# Patient Record
Sex: FEMALE
[De-identification: names, ages, dates, MRNs, and addresses within clinical notes are randomized per-mention and may not be internally consistent; named-entity substitution may affect disease eponyms.]

---

## 2022-04-06 ENCOUNTER — APPOINTMENT (OUTPATIENT)
Dept: SURGICAL ONCOLOGY | Facility: CLINIC | Age: 57
End: 2022-04-06
Payer: COMMERCIAL

## 2022-04-06 VITALS
SYSTOLIC BLOOD PRESSURE: 147 MMHG | DIASTOLIC BLOOD PRESSURE: 80 MMHG | RESPIRATION RATE: 16 BRPM | HEART RATE: 96 BPM | OXYGEN SATURATION: 73 % | TEMPERATURE: 96.7 F | HEIGHT: 61 IN | BODY MASS INDEX: 45.12 KG/M2 | WEIGHT: 239 LBS

## 2022-04-06 DIAGNOSIS — I10 ESSENTIAL (PRIMARY) HYPERTENSION: ICD-10-CM

## 2022-04-06 DIAGNOSIS — D05.00 LOBULAR CARCINOMA IN SITU OF UNSPECIFIED BREAST: ICD-10-CM

## 2022-04-06 DIAGNOSIS — R92.1 MAMMOGRAPHIC CALCIFICATION FOUND ON DIAGNOSTIC IMAGING OF BREAST: ICD-10-CM

## 2022-04-06 DIAGNOSIS — Z78.9 OTHER SPECIFIED HEALTH STATUS: ICD-10-CM

## 2022-04-06 DIAGNOSIS — Z87.59 PERSONAL HISTORY OF OTHER COMPLICATIONS OF PREGNANCY, CHILDBIRTH AND THE PUERPERIUM: ICD-10-CM

## 2022-04-06 DIAGNOSIS — J45.909 UNSPECIFIED ASTHMA, UNCOMPLICATED: ICD-10-CM

## 2022-04-06 DIAGNOSIS — D05.01 LOBULAR CARCINOMA IN SITU OF RIGHT BREAST: ICD-10-CM

## 2022-04-06 PROCEDURE — 99204 OFFICE O/P NEW MOD 45 MIN: CPT

## 2022-04-06 RX ORDER — AMLODIPINE BESYLATE 5 MG/1
5 TABLET ORAL
Refills: 0 | Status: ACTIVE | COMMUNITY

## 2022-04-06 RX ORDER — ALBUTEROL SULFATE 2.5 MG/.5ML
SOLUTION RESPIRATORY (INHALATION)
Refills: 0 | Status: ACTIVE | COMMUNITY

## 2022-04-06 NOTE — REASON FOR VISIT
[Initial Consultation] : an initial consultation for [Spouse] : spouse [FreeTextEntry2] : Right breast LCIS

## 2022-04-06 NOTE — PHYSICAL EXAM
[Normal] : supple, no neck mass and thyroid not enlarged [Normal Supraclavicular Lymph Nodes] : normal supraclavicular lymph nodes [Normal Axillary Lymph Nodes] : normal axillary lymph nodes [Normal] : oriented to person, place and time, with appropriate affect [FreeTextEntry1] : RADHA present during exam  [de-identified] : Normal S1, S2.  Regular rate and rhythm. [de-identified] : Complete breast exam performed in supine and upright positions.  No palpable masses, tenderness, nipple discharge, inversion, deviation or enlarged axillary or supraclavicular lymph nodes bilaterally. [de-identified] : Clear breath sounds bilaterally, normal respiratory effort.

## 2022-04-06 NOTE — ASSESSMENT
[FreeTextEntry1] : IMP:\par Breast calcifications\par S/p right breast bx 3/2022- PATH: LCIS\par \par \par PLAN:\par Breast MRI (ordered)\par Plan for right magseed localized lumpectomy

## 2022-04-06 NOTE — CONSULT LETTER
[Dear  ___] : Dear  [unfilled], [Consult Letter:] : I had the pleasure of evaluating your patient, [unfilled]. [Consult Closing:] : Thank you very much for allowing me to participate in the care of this patient.  If you have any questions, please do not hesitate to contact me. [Sincerely,] : Sincerely, [FreeTextEntry2] : Gamal Langston [FreeTextEntry3] : Dr. Jason Kim

## 2022-04-06 NOTE — HISTORY OF PRESENT ILLNESS
[de-identified] : Ms. Sosa is a 55 y/o female presenting today for an initial consultation for newly diagnosed right breast LCIS, referred by Dr. Gamal Langston. \par \par Patient underwent a screening mammogram which demonstrated focal nodular densities and calcifications bilaterally in 3/2021 (BIRADS 0).  Pt. underwent a bilateral diagnostic mammogram and bilateral breast ultrasound on 22 which showed right upper inner quadrant indeterminate calcifications for which a stereotactic guided biopsy is recommended. Management of additional calcifications in the right upper outer and right lower outer breast to be determined based on above biopsy results. BIRADS-4. \par \par Patient is s/p right breast 2:00 biopsy on 3/9/2022- PATH: LCIS present within and outside of hyalinized fibroadenoma associated with calcifications.  CONCORDANT. Surgical consultation recommended. A six month f/u diagnostic mammo recommended to ensure stability of numerous scattered additional calcifications.  \par \par Denies palpable breast masses, nipple discharge, skin dimpling, inversion or breast pain. \par \par Past medical history notable for HTN (on amlodipine) and Asthma (albuterol PRN).  Denies surgical history. Denies family history of cancer.  Never a smoker. No alcohol use. .  LMP: @ age 55 y/o.

## 2022-04-14 ENCOUNTER — RESULT REVIEW (OUTPATIENT)
Age: 57
End: 2022-04-14

## 2022-04-14 ENCOUNTER — OUTPATIENT (OUTPATIENT)
Dept: OUTPATIENT SERVICES | Facility: HOSPITAL | Age: 57
LOS: 1 days | End: 2022-04-14
Payer: COMMERCIAL

## 2022-04-14 DIAGNOSIS — C50.919 MALIGNANT NEOPLASM OF UNSPECIFIED SITE OF UNSPECIFIED FEMALE BREAST: ICD-10-CM

## 2022-04-14 PROCEDURE — 88321 CONSLTJ&REPRT SLD PREP ELSWR: CPT

## 2022-04-20 LAB — SURGICAL PATHOLOGY STUDY: SIGNIFICANT CHANGE UP

## 2022-05-19 ENCOUNTER — APPOINTMENT (OUTPATIENT)
Dept: MRI IMAGING | Facility: IMAGING CENTER | Age: 57
End: 2022-05-19

## 2022-06-21 ENCOUNTER — APPOINTMENT (OUTPATIENT)
Dept: MRI IMAGING | Facility: IMAGING CENTER | Age: 57
End: 2022-06-21
Payer: COMMERCIAL

## 2022-06-21 ENCOUNTER — OUTPATIENT (OUTPATIENT)
Dept: OUTPATIENT SERVICES | Facility: HOSPITAL | Age: 57
LOS: 1 days | End: 2022-06-21
Payer: COMMERCIAL

## 2022-06-21 DIAGNOSIS — D05.01 LOBULAR CARCINOMA IN SITU OF RIGHT BREAST: ICD-10-CM

## 2022-06-21 PROCEDURE — 77049 MRI BREAST C-+ W/CAD BI: CPT | Mod: 26

## 2022-06-21 PROCEDURE — C8908: CPT

## 2022-06-21 PROCEDURE — A9585: CPT

## 2022-06-21 PROCEDURE — C8937: CPT
